# Patient Record
Sex: FEMALE | Race: WHITE | NOT HISPANIC OR LATINO | Employment: FULL TIME | URBAN - METROPOLITAN AREA
[De-identification: names, ages, dates, MRNs, and addresses within clinical notes are randomized per-mention and may not be internally consistent; named-entity substitution may affect disease eponyms.]

---

## 2019-05-16 ENCOUNTER — HOSPITAL ENCOUNTER (OUTPATIENT)
Dept: MAMMOGRAPHY | Facility: CLINIC | Age: 39
Discharge: HOME | End: 2019-05-16
Attending: OBSTETRICS & GYNECOLOGY | Admitting: OBSTETRICS & GYNECOLOGY
Payer: COMMERCIAL

## 2019-05-16 DIAGNOSIS — Z12.31 VISIT FOR SCREENING MAMMOGRAM: ICD-10-CM

## 2019-05-16 PROCEDURE — 77063 BREAST TOMOSYNTHESIS BI: CPT

## 2020-06-17 ENCOUNTER — HOSPITAL ENCOUNTER (OUTPATIENT)
Dept: MAMMOGRAPHY | Facility: CLINIC | Age: 40
Discharge: HOME | End: 2020-06-17
Attending: OBSTETRICS & GYNECOLOGY | Admitting: OBSTETRICS & GYNECOLOGY
Payer: COMMERCIAL

## 2020-06-17 DIAGNOSIS — Z12.31 VISIT FOR SCREENING MAMMOGRAM: ICD-10-CM

## 2020-06-17 PROCEDURE — 77063 BREAST TOMOSYNTHESIS BI: CPT

## 2021-06-21 ENCOUNTER — HOSPITAL ENCOUNTER (OUTPATIENT)
Dept: MAMMOGRAPHY | Facility: CLINIC | Age: 41
Discharge: HOME | End: 2021-06-21
Attending: OBSTETRICS & GYNECOLOGY | Admitting: OBSTETRICS & GYNECOLOGY
Payer: COMMERCIAL

## 2021-06-21 DIAGNOSIS — Z12.31 VISIT FOR SCREENING MAMMOGRAM: ICD-10-CM

## 2021-06-21 PROCEDURE — 77063 BREAST TOMOSYNTHESIS BI: CPT

## 2022-04-26 ENCOUNTER — OFFICE VISIT (OUTPATIENT)
Dept: VASCULAR SURGERY | Facility: CLINIC | Age: 42
End: 2022-04-26
Payer: COMMERCIAL

## 2022-04-26 DIAGNOSIS — I78.1 ASYMPTOMATIC SPIDER VEINS OF BOTH LOWER EXTREMITIES: Primary | ICD-10-CM

## 2022-04-26 PROCEDURE — 99202 OFFICE O/P NEW SF 15 MIN: CPT | Performed by: SURGERY

## 2022-04-26 NOTE — LETTER
4/26/2022         RE: Tish Billy  [address]  [place] [address]        Dear Colleague,    Thank you for referring your patient, Tish Billy, to the [facility] [place]. Please see a copy of my visit note below.    Patient Reported symptoms:    Right leg   Heaviness None of the time   Achiness None of the time   Swelling None of the time   Throbbing None of the time   Itching None of the time   Appearance Slightly noticeable   Impact on work/activities no symptoms    Left Leg   Heaviness None of the time   Achiness None of the time   Swelling None of the time   Throbbing None of the time   Itching None of the time   Appearance Slightly noticeable   Impact on work/activities no symptoms    VEIN SOLUTIONS CONSULT    Impression:   Asymptomatic spider veins of bilateral lower extremities.    Plan:   I had a nice discussion with Tish regarding basic pathophysiology and treatment of lower extremity spider veins.  She would be a very good candidate for limited cosmetic injection sclerotherapy.  I discussed the specifics of that including potential complications and the anticipated postprocedural course.  She verbalizes full understanding to the above.  She will likely wait until fall and then potentially pursue cosmetic sclerotherapy.    HPI:   Tish Billy is a healthy 42-year-old female who presents at this time for evaluation of mild bilateral lower extremity asymptomatic spider veins.  Her mother had significant spider veins.  Tish is done having children.  She has no personal history of venous intervention.  She is simply interested in improved appearance of her legs.      CURRENT MEDICATIONS  levothyroxine (SYNTHROID,LEVOTHROID) 100 MCG tablet, Take 1 tablet (100 mcg) by mouth daily (Patient taking differently: Take 112 mcg by mouth daily)  levothyroxine (SYNTHROID) 25 mcg/mL, Take 137 mcg/kg by mouth daily (Patient not taking: Reported on 4/26/2022)  Prenatal  Vit-Fe Fumarate-FA (PRENATAL MULTIVITAMIN  PLUS IRON) 27-0.8 MG TABS, Take 1 tablet by mouth daily (Patient not taking: Reported on 2022)  senna-docusate (SENOKOT-S;PERICOLACE) 8.6-50 MG per tablet, Take 1-2 tablets by mouth 2 times daily (Patient not taking: Reported on 2022)  valACYclovir (VALTREX) 1000 mg tablet, Take 2 tablets (2,000 mg) by mouth 2 times daily (Patient not taking: Reported on 2022)    No current facility-administered medications on file prior to visit.        PAST MEDICAL HISTORY  Past Medical History:   Diagnosis Date     Fertility problem     with first pregnancy     hypothyroid      Seasonal allergies      Thyroid disease          PAST SURGICAL HISTORY:  Past Surgical History:   Procedure Laterality Date     NO HISTORY OF SURGERY       wisdom teeth  1999       ALLERGIES   No Known Allergies    FAMILY HISTORY  Family History   Problem Relation Age of Onset     Diabetes Paternal Grandmother      Cerebrovascular Disease Maternal Grandfather      Hypertension Maternal Grandfather      Cancer Maternal Grandfather      Hypertension Maternal Grandmother      Hypertension Mother      Breast Cancer Maternal Grandmother        SOCIAL HISTORY  Social History     Tobacco Use     Smoking status: Former Smoker     Quit date: 2010     Years since quittin.2     Smokeless tobacco: Never Used   Substance Use Topics     Alcohol use: No     Comment: occ     Drug use: No       ROS:   Review of Systems   All other systems reviewed and are negative.        EXAM:  There were no vitals taken for this visit.  Physical Exam  Constitutional:       Appearance: Normal appearance.   HENT:      Head: Normocephalic and atraumatic.   Eyes:      General: No scleral icterus.     Extraocular Movements: Extraocular movements intact.      Pupils: Pupils are equal, round, and reactive to light.   Cardiovascular:      Comments: Scattered, mild spider veins on the lateral right thigh and bilateral calves.  No  varicosities.  No edema.  Musculoskeletal:         General: Normal range of motion.   Skin:     General: Skin is warm and dry.   Neurological:      General: No focal deficit present.      Mental Status: She is alert and oriented to person, place, and time. Mental status is at baseline.   Psychiatric:         Mood and Affect: Mood normal.         Behavior: Behavior normal.         Thought Content: Thought content normal.         Judgment: Judgment normal.       Labs:  LIPID RESULTS:  No results found for: CHOL, HDL, LDL, TRIG, CHOLHDLRATIO    CBC RESULTS:  Lab Results   Component Value Date    HGB 12.1 02/08/2014       BMP RESULTS:  No results found for: NA, POTASSIUM, CHLORIDE, CO2, ANIONGAP, GLC, BUN, CR, GFRESTIMATED, GFRESTBLACK, GATO     A1C RESULTS:  No results found for: A1C      Imaging:  No results found for this or any previous visit (from the past 24 hour(s)).    Total length of this encounter was 15 minutes.          Gabe Abreu MD          Again, thank you for allowing me to participate in the care of your patient.        Sincerely,        Gabe Abreu MD

## 2022-04-26 NOTE — PROGRESS NOTES
VEIN SOLUTIONS CONSULT    Impression:   Asymptomatic spider veins of bilateral lower extremities.    Plan:   I had a nice discussion with Tish regarding basic pathophysiology and treatment of lower extremity spider veins.  She would be a very good candidate for limited cosmetic injection sclerotherapy.  I discussed the specifics of that including potential complications and the anticipated postprocedural course.  She verbalizes full understanding to the above.  She will likely wait until fall and then potentially pursue cosmetic sclerotherapy.    HPI:   Tish Billy is a healthy 42-year-old female who presents at this time for evaluation of mild bilateral lower extremity asymptomatic spider veins.  Her mother had significant spider veins.  Tish is done having children.  She has no personal history of venous intervention.  She is simply interested in improved appearance of her legs.      CURRENT MEDICATIONS  levothyroxine (SYNTHROID,LEVOTHROID) 100 MCG tablet, Take 1 tablet (100 mcg) by mouth daily (Patient taking differently: Take 112 mcg by mouth daily)  levothyroxine (SYNTHROID) 25 mcg/mL, Take 137 mcg/kg by mouth daily (Patient not taking: Reported on 4/26/2022)  Prenatal Vit-Fe Fumarate-FA (PRENATAL MULTIVITAMIN  PLUS IRON) 27-0.8 MG TABS, Take 1 tablet by mouth daily (Patient not taking: Reported on 4/26/2022)  senna-docusate (SENOKOT-S;PERICOLACE) 8.6-50 MG per tablet, Take 1-2 tablets by mouth 2 times daily (Patient not taking: Reported on 4/26/2022)  valACYclovir (VALTREX) 1000 mg tablet, Take 2 tablets (2,000 mg) by mouth 2 times daily (Patient not taking: Reported on 4/26/2022)    No current facility-administered medications on file prior to visit.        PAST MEDICAL HISTORY  Past Medical History:   Diagnosis Date     Fertility problem     with first pregnancy     hypothyroid      Seasonal allergies      Thyroid disease          PAST SURGICAL HISTORY:  Past Surgical History:   Procedure  Laterality Date     NO HISTORY OF SURGERY       wisdom teeth  1999       ALLERGIES   No Known Allergies    FAMILY HISTORY  Family History   Problem Relation Age of Onset     Diabetes Paternal Grandmother      Cerebrovascular Disease Maternal Grandfather      Hypertension Maternal Grandfather      Cancer Maternal Grandfather      Hypertension Maternal Grandmother      Hypertension Mother      Breast Cancer Maternal Grandmother        SOCIAL HISTORY  Social History     Tobacco Use     Smoking status: Former Smoker     Quit date: 2010     Years since quittin.2     Smokeless tobacco: Never Used   Substance Use Topics     Alcohol use: No     Comment: occ     Drug use: No       ROS:   Review of Systems   All other systems reviewed and are negative.        EXAM:  There were no vitals taken for this visit.  Physical Exam  Constitutional:       Appearance: Normal appearance.   HENT:      Head: Normocephalic and atraumatic.   Eyes:      General: No scleral icterus.     Extraocular Movements: Extraocular movements intact.      Pupils: Pupils are equal, round, and reactive to light.   Cardiovascular:      Comments: Scattered, mild spider veins on the lateral right thigh and bilateral calves.  No varicosities.  No edema.  Musculoskeletal:         General: Normal range of motion.   Skin:     General: Skin is warm and dry.   Neurological:      General: No focal deficit present.      Mental Status: She is alert and oriented to person, place, and time. Mental status is at baseline.   Psychiatric:         Mood and Affect: Mood normal.         Behavior: Behavior normal.         Thought Content: Thought content normal.         Judgment: Judgment normal.       Labs:  LIPID RESULTS:  No results found for: CHOL, HDL, LDL, TRIG, CHOLHDLRATIO    CBC RESULTS:  Lab Results   Component Value Date    HGB 12.1 2014       BMP RESULTS:  No results found for: NA, POTASSIUM, CHLORIDE, CO2, ANIONGAP, GLC, BUN, CR, GFRESTIMATED,  GFRGATO OCASIO     A1C RESULTS:  No results found for: A1C      Imaging:  No results found for this or any previous visit (from the past 24 hour(s)).    Total length of this encounter was 15 minutes.          Gabe Abreu MD

## 2022-04-26 NOTE — PROGRESS NOTES
Patient Reported symptoms:    Right leg   Heaviness None of the time   Achiness None of the time   Swelling None of the time   Throbbing None of the time   Itching None of the time   Appearance Slightly noticeable   Impact on work/activities no symptoms    Left Leg   Heaviness None of the time   Achiness None of the time   Swelling None of the time   Throbbing None of the time   Itching None of the time   Appearance Slightly noticeable   Impact on work/activities no symptoms

## 2022-06-30 ENCOUNTER — HOSPITAL ENCOUNTER (OUTPATIENT)
Dept: MAMMOGRAPHY | Facility: CLINIC | Age: 42
Discharge: HOME | End: 2022-06-30
Attending: OBSTETRICS & GYNECOLOGY | Admitting: OBSTETRICS & GYNECOLOGY
Payer: COMMERCIAL

## 2022-06-30 DIAGNOSIS — Z12.31 VISIT FOR SCREENING MAMMOGRAM: ICD-10-CM

## 2022-06-30 PROCEDURE — 77067 SCR MAMMO BI INCL CAD: CPT

## 2022-07-08 ENCOUNTER — HOSPITAL ENCOUNTER (OUTPATIENT)
Dept: ULTRASOUND IMAGING | Facility: CLINIC | Age: 42
Discharge: HOME | End: 2022-07-08
Attending: OBSTETRICS & GYNECOLOGY | Admitting: OBSTETRICS & GYNECOLOGY
Payer: COMMERCIAL

## 2022-07-08 DIAGNOSIS — R92.8 ABNORMAL MAMMOGRAM: ICD-10-CM

## 2022-07-08 PROCEDURE — 76642 ULTRASOUND BREAST LIMITED: CPT | Mod: LT

## 2022-08-21 ENCOUNTER — HEALTH MAINTENANCE LETTER (OUTPATIENT)
Age: 42
End: 2022-08-21

## 2022-11-20 ENCOUNTER — HEALTH MAINTENANCE LETTER (OUTPATIENT)
Age: 42
End: 2022-11-20

## 2023-07-03 ENCOUNTER — HOSPITAL ENCOUNTER (OUTPATIENT)
Dept: MAMMOGRAPHY | Facility: CLINIC | Age: 43
Discharge: HOME | End: 2023-07-03
Attending: OBSTETRICS & GYNECOLOGY | Admitting: OBSTETRICS & GYNECOLOGY
Payer: COMMERCIAL

## 2023-07-03 DIAGNOSIS — Z12.31 VISIT FOR SCREENING MAMMOGRAM: ICD-10-CM

## 2023-07-03 PROCEDURE — 77067 SCR MAMMO BI INCL CAD: CPT

## 2023-09-16 ENCOUNTER — HEALTH MAINTENANCE LETTER (OUTPATIENT)
Age: 43
End: 2023-09-16

## 2024-07-11 ENCOUNTER — HOSPITAL ENCOUNTER (OUTPATIENT)
Dept: MAMMOGRAPHY | Facility: CLINIC | Age: 44
Discharge: HOME | End: 2024-07-11
Attending: OBSTETRICS & GYNECOLOGY | Admitting: OBSTETRICS & GYNECOLOGY
Payer: COMMERCIAL

## 2024-07-11 DIAGNOSIS — Z12.31 VISIT FOR SCREENING MAMMOGRAM: ICD-10-CM

## 2024-07-11 PROCEDURE — 77063 BREAST TOMOSYNTHESIS BI: CPT

## 2024-11-09 ENCOUNTER — HEALTH MAINTENANCE LETTER (OUTPATIENT)
Age: 44
End: 2024-11-09